# Patient Record
Sex: FEMALE | Race: WHITE
[De-identification: names, ages, dates, MRNs, and addresses within clinical notes are randomized per-mention and may not be internally consistent; named-entity substitution may affect disease eponyms.]

---

## 2019-02-25 VITALS — DIASTOLIC BLOOD PRESSURE: 74 MMHG | SYSTOLIC BLOOD PRESSURE: 138 MMHG

## 2019-02-26 ENCOUNTER — HOSPITAL ENCOUNTER (INPATIENT)
Dept: HOSPITAL 61 - 5 SOUTH | Age: 84
LOS: 3 days | Discharge: HOME | DRG: 690 | End: 2019-03-01
Attending: FAMILY MEDICINE | Admitting: FAMILY MEDICINE
Payer: MEDICARE

## 2019-02-26 VITALS — BODY MASS INDEX: 32.44 KG/M2 | HEIGHT: 64 IN | WEIGHT: 190.03 LBS

## 2019-02-26 VITALS — DIASTOLIC BLOOD PRESSURE: 99 MMHG | SYSTOLIC BLOOD PRESSURE: 182 MMHG

## 2019-02-26 VITALS — DIASTOLIC BLOOD PRESSURE: 74 MMHG | SYSTOLIC BLOOD PRESSURE: 138 MMHG

## 2019-02-26 DIAGNOSIS — E11.9: ICD-10-CM

## 2019-02-26 DIAGNOSIS — Z90.89: ICD-10-CM

## 2019-02-26 DIAGNOSIS — I10: ICD-10-CM

## 2019-02-26 DIAGNOSIS — Z86.73: ICD-10-CM

## 2019-02-26 DIAGNOSIS — Z85.828: ICD-10-CM

## 2019-02-26 DIAGNOSIS — Z83.3: ICD-10-CM

## 2019-02-26 DIAGNOSIS — K57.30: ICD-10-CM

## 2019-02-26 DIAGNOSIS — Z98.42: ICD-10-CM

## 2019-02-26 DIAGNOSIS — Z90.710: ICD-10-CM

## 2019-02-26 DIAGNOSIS — Z98.41: ICD-10-CM

## 2019-02-26 DIAGNOSIS — Z80.0: ICD-10-CM

## 2019-02-26 DIAGNOSIS — Z79.82: ICD-10-CM

## 2019-02-26 DIAGNOSIS — Z87.442: ICD-10-CM

## 2019-02-26 DIAGNOSIS — N12: Primary | ICD-10-CM

## 2019-02-26 LAB
ALBUMIN SERPL-MCNC: 3.3 G/DL (ref 3.4–5)
ALBUMIN/GLOB SERPL: 0.8 {RATIO} (ref 1–1.7)
ALP SERPL-CCNC: 90 U/L (ref 46–116)
ALT SERPL-CCNC: 14 U/L (ref 14–59)
AMYLASE SERPL-CCNC: 35 U/L (ref 25–115)
ANION GAP SERPL CALC-SCNC: 11 MMOL/L (ref 6–14)
AST SERPL-CCNC: 14 U/L (ref 15–37)
BASOPHILS # BLD AUTO: 0 X10^3/UL (ref 0–0.2)
BASOPHILS NFR BLD: 0 % (ref 0–3)
BILIRUB SERPL-MCNC: 0.5 MG/DL (ref 0.2–1)
BUN SERPL-MCNC: 26 MG/DL (ref 7–20)
BUN/CREAT SERPL: 26 (ref 6–20)
CALCIUM SERPL-MCNC: 8.8 MG/DL (ref 8.5–10.1)
CHLORIDE SERPL-SCNC: 98 MMOL/L (ref 98–107)
CO2 SERPL-SCNC: 27 MMOL/L (ref 21–32)
CREAT SERPL-MCNC: 1 MG/DL (ref 0.6–1)
EOSINOPHIL NFR BLD: 0 % (ref 0–3)
EOSINOPHIL NFR BLD: 0 X10^3/UL (ref 0–0.7)
ERYTHROCYTE [DISTWIDTH] IN BLOOD BY AUTOMATED COUNT: 15.1 % (ref 11.5–14.5)
GFR SERPLBLD BASED ON 1.73 SQ M-ARVRAT: 52.3 ML/MIN
GLOBULIN SER-MCNC: 4 G/DL (ref 2.2–3.8)
GLUCOSE SERPL-MCNC: 201 MG/DL (ref 70–99)
HCT VFR BLD CALC: 39.7 % (ref 36–47)
HGB BLD-MCNC: 13.2 G/DL (ref 12–15.5)
LIPASE: 71 U/L (ref 73–393)
LYMPHOCYTES # BLD: 1.4 X10^3/UL (ref 1–4.8)
LYMPHOCYTES NFR BLD AUTO: 14 % (ref 24–48)
MCH RBC QN AUTO: 27 PG (ref 25–35)
MCHC RBC AUTO-ENTMCNC: 33 G/DL (ref 31–37)
MCV RBC AUTO: 81 FL (ref 79–100)
MONO #: 0.6 X10^3/UL (ref 0–1.1)
MONOCYTES NFR BLD: 6 % (ref 0–9)
NEUT #: 8.4 X10^3UL (ref 1.8–7.7)
NEUTROPHILS NFR BLD AUTO: 80 % (ref 31–73)
PLATELET # BLD AUTO: 236 X10^3/UL (ref 140–400)
POTASSIUM SERPL-SCNC: 3.9 MMOL/L (ref 3.5–5.1)
PROT SERPL-MCNC: 7.3 G/DL (ref 6.4–8.2)
RBC # BLD AUTO: 4.91 X10^6/UL (ref 3.5–5.4)
SODIUM SERPL-SCNC: 136 MMOL/L (ref 136–145)
WBC # BLD AUTO: 10.4 X10^3/UL (ref 4–11)

## 2019-02-26 PROCEDURE — 81001 URINALYSIS AUTO W/SCOPE: CPT

## 2019-02-26 PROCEDURE — 87086 URINE CULTURE/COLONY COUNT: CPT

## 2019-02-26 PROCEDURE — 85025 COMPLETE CBC W/AUTO DIFF WBC: CPT

## 2019-02-26 PROCEDURE — C9113 INJ PANTOPRAZOLE SODIUM, VIA: HCPCS

## 2019-02-26 PROCEDURE — 83690 ASSAY OF LIPASE: CPT

## 2019-02-26 PROCEDURE — 83036 HEMOGLOBIN GLYCOSYLATED A1C: CPT

## 2019-02-26 PROCEDURE — 82150 ASSAY OF AMYLASE: CPT

## 2019-02-26 PROCEDURE — 74177 CT ABD & PELVIS W/CONTRAST: CPT

## 2019-02-26 PROCEDURE — 36415 COLL VENOUS BLD VENIPUNCTURE: CPT

## 2019-02-26 PROCEDURE — 80053 COMPREHEN METABOLIC PANEL: CPT

## 2019-02-26 RX ADMIN — SODIUM CHLORIDE SCH MLS/HR: 450 INJECTION, SOLUTION INTRAVENOUS at 22:20

## 2019-02-26 RX ADMIN — ONDANSETRON PRN MG: 2 INJECTION INTRAMUSCULAR; INTRAVENOUS at 22:20

## 2019-02-26 NOTE — NUR
The patient, OPHELIA MAHMOOD, 87 y/o, F admitted by GABI BROCK MD, was given written 
information regarding hospital policies, unit procedures and contact persons.  Patient 
arrived to room via wheelchair assisted by registration staff member. Patient's daughter is 
at bedside at this time. 



Valuables were checked and noted. The patient was resting in bed at this time. Patient is 
Alert and oriented and is able to make their needs known at this time. Patient states no 
complaints of pain or discomfort while being asked admission questions. However the patient 
did become nauseous and started to vomit shortly after finishing admission questions. This 
RN is attempting to reach MD at this time for admit orders. The patient's call light is 
within reach. This RN will continue to monitor the patient at this time.

## 2019-02-27 VITALS — DIASTOLIC BLOOD PRESSURE: 76 MMHG | SYSTOLIC BLOOD PRESSURE: 131 MMHG

## 2019-02-27 VITALS — DIASTOLIC BLOOD PRESSURE: 71 MMHG | SYSTOLIC BLOOD PRESSURE: 126 MMHG

## 2019-02-27 VITALS — SYSTOLIC BLOOD PRESSURE: 131 MMHG | DIASTOLIC BLOOD PRESSURE: 63 MMHG

## 2019-02-27 VITALS — SYSTOLIC BLOOD PRESSURE: 117 MMHG | DIASTOLIC BLOOD PRESSURE: 67 MMHG

## 2019-02-27 VITALS — SYSTOLIC BLOOD PRESSURE: 124 MMHG | DIASTOLIC BLOOD PRESSURE: 74 MMHG

## 2019-02-27 VITALS — DIASTOLIC BLOOD PRESSURE: 44 MMHG | SYSTOLIC BLOOD PRESSURE: 135 MMHG

## 2019-02-27 LAB
APTT PPP: YELLOW S
BACTERIA #/AREA URNS HPF: 0 /HPF
BILIRUB UR QL STRIP: NEGATIVE
FIBRINOGEN PPP-MCNC: CLEAR MG/DL
NITRITE UR QL STRIP: NEGATIVE
PH UR STRIP: 6.5 [PH]
PROT UR STRIP-MCNC: NEGATIVE MG/DL
RBC #/AREA URNS HPF: 0 /HPF (ref 0–2)
UROBILINOGEN UR-MCNC: 0.2 MG/DL
WBC #/AREA URNS HPF: (no result) /HPF (ref 0–4)

## 2019-02-27 RX ADMIN — SODIUM CHLORIDE SCH MLS/HR: 450 INJECTION, SOLUTION INTRAVENOUS at 08:59

## 2019-02-27 RX ADMIN — ONDANSETRON PRN MG: 2 INJECTION INTRAMUSCULAR; INTRAVENOUS at 08:56

## 2019-02-27 RX ADMIN — CLOPIDOGREL BISULFATE SCH MG: 75 TABLET ORAL at 08:55

## 2019-02-27 RX ADMIN — SODIUM CHLORIDE SCH MLS/HR: 450 INJECTION, SOLUTION INTRAVENOUS at 19:59

## 2019-02-27 RX ADMIN — ATORVASTATIN CALCIUM SCH MG: 40 TABLET, FILM COATED ORAL at 20:00

## 2019-02-27 RX ADMIN — CEFTRIAXONE SCH GM: 1 INJECTION, POWDER, FOR SOLUTION INTRAMUSCULAR; INTRAVENOUS at 10:09

## 2019-02-27 RX ADMIN — ASPIRIN 81 MG SCH MG: 81 TABLET ORAL at 08:56

## 2019-02-27 NOTE — NUR
SW following, discussed with RN. Pt is from home with . RN advised no SW needs at 
this time. SW will continue to follow.

## 2019-02-27 NOTE — HP
ADMIT DATE:  02/26/2019



CHIEF COMPLAINT AND HISTORY OF PRESENT ILLNESS:  This 88-year-old white female

is well known to me from followup in the office.  I did see the patient earlier

in the day in the office and she was felt to have a left pyelonephritis that was

partially treated.  She had been taking some amoxicillin at home that she had

left over from prior infection, but was just overall feeling horrible.  She

denied, however, any fevers, chills or sweats.  She was taking p.o. without

nausea or vomiting at that time; however, still appeared ill, was started on

Cipro with the urine culture obtained, was told if she was getting worse to call

the  who called on the evening of admission, stating she was much worse

now with nausea, vomiting, inability to keep things down and the patient was

directly admitted to the floor for IV hydration, nausea control, IV antibiotics

while we are awaiting cultures.



PAST MEDICAL HISTORY:  Remarkable for hypertension.  She had a prior

tonsillectomy, adenoidectomy, prior hysterectomy, history of skin cancers, has

had urinary tract infections over the years.  She does have a history of a

stroke.



MEDICATIONS:  Brought with the patient, listed on the computer, have been

addressed.



ALLERGIES:  She has no known drug allergies.



SOCIAL HISTORY:  The patient is a nonsmoker, nondrinker.  , lives at home

with her .



FAMILY HISTORY:  Noncontributory.



REVIEW OF SYSTEMS:  Remarkable for just the generalized feeling horrible all

over, the nausea, and vomiting which has gotten better with Zofran, ongoing left

flank pain.  She denies any dysuria, etc.



PHYSICAL EXAMINATION:

GENERAL:  She is well-developed, well-nourished white female, in no acute

distress.  Daughter is in attendance.

VITAL SIGNS:  Stable.  She is afebrile.

HEAD, EYES, EARS, NOSE AND THROAT:  Remarkable for several scabs on her left

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

keratosis.

NECK:  Supple, without adenopathy or thyromegaly.

CHEST:  Clear to auscultation and percussion.

HEART:  Regular rate and rhythm without S3, S4, or murmur.

ABDOMEN:  Soft, nontender, without hepatosplenomegaly or masses.  She does have

some left flank tenderness.

EXTREMITIES:  Without cyanosis, clubbing, edema.

NEUROLOGIC:  She is intact.



IMPRESSION:  Left pyelonephritis with failed outpatient treatment with nausea,

vomiting as discussed above.



PLAN:  Continue present IV hydration.  I am going to keep her on IV Rocephin

until we have a urine culture back and if one does not look like it was obtained

here yesterday as ordered, but we have one cooking in the office.

 



______________________________

GABI BROCK MD



DR:  REGINA/humphrey  JOB#:  [id number] / [id number]

DD:  02/27/2019 08:01  DT:  02/27/2019 08:38

## 2019-02-27 NOTE — RAD
CT study of the abdomen and pelvis with contrast

 

Clinical indications: Abdominal pain. Left flank pain. History of 

hysterectomy. History of skin cancer.

 

TECHNIQUE: After IV infusion of 60 cc of Omnipaque 300, helical CT 

scanning of the abdomen and pelvis was performed. No GI contrast was 

administered. This may decrease the sensitivity to detect GI tract 

pathology.

 

PQRS compliance Statement

 

One or more of the following individualized dose reduction techniques were

utilized for this study:

1.  Automated exposure control

2.  Adjustment of the mA and/or kV according to patient size

3.  Use of iterative reconstruction technique

 

FINDINGS: The liver and spleen are unremarkable. Diffuse fatty atrophy of 

the pancreas is seen. The gallbladder is normal and no extrahepatic 

biliary ductal dilatation is seen. No adrenal mass is evident. Small 

punctate nonobstructing stone of the left kidney is seen. No 

hydronephrosis or hydroureter is seen. No renal mass is evident. Urinary 

bladder wall is smooth. No focal aneurysmal dilatation of the abdominal 

aorta is seen. No enlarged abdominal or pelvic lymphadenopathy is evident.

No obstructive bowel pattern is evident. The appendix is normal. Terminal 

ileum is unremarkable. Mild sigmoid diverticulosis is seen without 

diverticulitis. A small hiatal hernia is seen. Stomach is not distended. 

No free air or free fluid or mesenteric edema is seen. No lytic process is

seen. No lung base consolidation is evident. Calcific granuloma of the 

posterior right lung base is seen.

 

IMPRESSION: No acute abnormality is evident.

 

Electronically signed by: Philippe Brito MD (2/27/2019 9:59 AM) [facility]

## 2019-02-28 VITALS — SYSTOLIC BLOOD PRESSURE: 152 MMHG | DIASTOLIC BLOOD PRESSURE: 97 MMHG

## 2019-02-28 VITALS — DIASTOLIC BLOOD PRESSURE: 86 MMHG | SYSTOLIC BLOOD PRESSURE: 150 MMHG

## 2019-02-28 VITALS — SYSTOLIC BLOOD PRESSURE: 156 MMHG | DIASTOLIC BLOOD PRESSURE: 92 MMHG

## 2019-02-28 VITALS — SYSTOLIC BLOOD PRESSURE: 134 MMHG | DIASTOLIC BLOOD PRESSURE: 68 MMHG

## 2019-02-28 VITALS — SYSTOLIC BLOOD PRESSURE: 169 MMHG | DIASTOLIC BLOOD PRESSURE: 88 MMHG

## 2019-02-28 VITALS — SYSTOLIC BLOOD PRESSURE: 153 MMHG | DIASTOLIC BLOOD PRESSURE: 92 MMHG

## 2019-02-28 LAB — HBA1C MFR BLD: 6.4 % (ref 4.8–5.6)

## 2019-02-28 RX ADMIN — ASPIRIN 81 MG SCH MG: 81 TABLET ORAL at 09:00

## 2019-02-28 RX ADMIN — SODIUM CHLORIDE SCH MLS/HR: 450 INJECTION, SOLUTION INTRAVENOUS at 06:05

## 2019-02-28 RX ADMIN — ONDANSETRON PRN MG: 2 INJECTION INTRAMUSCULAR; INTRAVENOUS at 03:35

## 2019-02-28 RX ADMIN — FENTANYL CITRATE PRN MCG: 50 INJECTION INTRAMUSCULAR; INTRAVENOUS at 09:02

## 2019-02-28 RX ADMIN — CEFTRIAXONE SCH GM: 1 INJECTION, POWDER, FOR SOLUTION INTRAMUSCULAR; INTRAVENOUS at 09:02

## 2019-02-28 RX ADMIN — FENTANYL CITRATE PRN MCG: 50 INJECTION INTRAMUSCULAR; INTRAVENOUS at 06:27

## 2019-02-28 RX ADMIN — Medication SCH CAP: at 20:24

## 2019-02-28 RX ADMIN — FENTANYL CITRATE PRN MCG: 50 INJECTION INTRAMUSCULAR; INTRAVENOUS at 03:29

## 2019-02-28 RX ADMIN — PANTOPRAZOLE SODIUM SCH MG: 40 INJECTION, POWDER, FOR SOLUTION INTRAVENOUS at 17:42

## 2019-02-28 RX ADMIN — ATORVASTATIN CALCIUM SCH MG: 40 TABLET, FILM COATED ORAL at 20:24

## 2019-02-28 RX ADMIN — CLOPIDOGREL BISULFATE SCH MG: 75 TABLET ORAL at 09:00

## 2019-02-28 RX ADMIN — ONDANSETRON PRN MG: 2 INJECTION INTRAMUSCULAR; INTRAVENOUS at 09:06

## 2019-02-28 RX ADMIN — ONDANSETRON PRN MG: 2 INJECTION INTRAMUSCULAR; INTRAVENOUS at 16:24

## 2019-02-28 NOTE — PN
DATE:  02/28/2019



She is in room 552.



SUBJECTIVE:  The patient is awake, alert, miserable with pain, nausea, no

vomiting during my exam.  Family is present.



OBJECTIVE:

VITAL SIGNS:  Stable and she is afebrile.  She is awake and alert.

LUNGS:  Clear.

HEART:  Regular.

ABDOMEN:  Benign.  She does have some left flank tenderness still to pressure,

but none to percussion this morning.



LABORATORY DATA:  CT abdomen and pelvis with contrast showed no acute

abnormality.  Urine here does not look like infection; however, she had

antibiotics prior to admission, not ruling out the possibility of a

pyelonephritis, it was partially treated.



ASSESSMENT:

1.  Left flank pain with nausea and vomiting, felt to be pyelo at least on

admission as an outpatient.  Urine culture cooking, which will be available

today or tomorrow.

2.  Nausea and vomiting.



PLAN:  Per family request.  We will add GI consult for the nausea and vomiting. 

We will ask Urology to see as it seems clinically that this is kidney generated,

but the urine and CT at this point would suggest not.

 



______________________________

GABI BROCK MD



DR:  REGINA/humphrey  JOB#:  [id number] / [id number]

DD:  02/28/2019 09:17  DT:  02/28/2019 20:08

## 2019-02-28 NOTE — PDOC2
GI CONSULT


Reason For Consult:


Vomiting





HPI:


HPI:


87 y/o female - history from chart, pt, and son.  History a bit difficult to 

nail down, but she hasn't been feeling well for 3 weeks.  Complains of left 

lower back ache - no radiation.  At some point felt "sick to her stomach" - 

timing is unclear and "I never felt nauseated!"  However, vomiting began after 

taking Cipro earlier this week.  Now says "the medicine makes me sick" - I 

believe referring to Zofran that she received after drinking broth this morning 

prior to "yellow" emesis.  





Denies reflux/heartburn, dysphagia, chronic n/v, hematemesis, abd pain, diarrhea

, constipation, hematochezia, melena, or weight loss.  Has to belch a lot.  Son 

says prior to this, her health has been "bullet proof."  She flips twin 

mattresses at home every Monday.  Her other son's friend is a hospitalist at 

[facility] and recommended an IVP.





Was seen in our office in 12/2017 for dysphagia and occasional heartburn.  

Interestingly, also reported n/w after taking Percocet for a back injury.  

Additionally, she was taking Nexium and meclizine at that time.  Had EGD w/ Dr. Coyle on 1/3/18 (which she did not recall this morning) which showed esophageal 

ring (dilated to 56Fr), hiatal hernia, and gastritis.  Distal esophagus and 

antral biopsies were negative (no H. pylori, EoE, or Horne's).  Thinks she 

had a colonoscopy 40 years ago that was normal.  Denies GB or pancreas history 

but had "the yellow jaundice" at age 11.  I believe she takes ASA and Plavix (

she says just to look at her list).  When asked about NSAIDs, she says "it 

thins my blood."  On Rocephin here, followed by urology.  Labs and CT 

unrevealing.





PMH:


PMH:


CVA, HTN, skin cancer, UTI, hiatal hernia, esophageal ring, diverticulosis, 

nephrolithiasis (noted on CT in 12/2017)


tonsillectomy, hysterectomy, left CEA, cataract removal





FH:


Family History:  Cancer (colon - sibling, aunt; breast - sibling, aunt, MGM), DM

, Other (Crohn's - sibling)





Social History:


Smoke:  No


ALCOHOL:  none


Drugs:  None





ROS:





GEN: Denies fevers, chills, sweats


HEENT: Denies blurred vision, sore throat


CV: Denies chest pain


RESP: Denies shortness of air, cough


GI: Per HPI


: Denies hematuria, dysuria


ENDO: Denies weight changes


NEURO: Denies confusion, dizziness


MSK: +back pain


SKIN: Denies jaundice, pruritus





Vitals:


Vitals:





 Vital Signs








  Date Time  Temp Pulse Resp B/P (MAP) Pulse Ox O2 Delivery O2 Flow Rate FiO2


 


2/28/19 11:01 98.6 78 20 150/86 (107) 96 Room Air  





 98.6       











Labs:


Labs:





Laboratory Tests








Test


 2/27/19


11:41


 


Urine Collection Type Unknown 


 


Urine Color Yellow 


 


Urine Clarity Clear 


 


Urine pH 6.5 


 


Urine Specific Gravity >=1.030 


 


Urine Protein


 Negative mg/dL


(NEG-TRACE)


 


Urine Glucose (UA)


 Negative mg/dL


(NEG)


 


Urine Ketones (Stick)


 Negative mg/dL


(NEG)


 


Urine Blood Negative (NEG) 


 


Urine Nitrite Negative (NEG) 


 


Urine Bilirubin Negative (NEG) 


 


Urine Urobilinogen Dipstick


 0.2 mg/dL (0.2


mg/dL)


 


Urine Leukocyte Esterase Negative (NEG) 


 


Urine RBC 0 /HPF (0-2) 


 


Urine WBC 1-4 /HPF (0-4) 


 


Urine Bacteria 0 /HPF (0-FEW) 











Allergies:


Coded Allergies:  


     No Known Drug Allergies (Unverified , 2/26/19)





Medications:





Current Medications








 Medications


  (Trade)  Dose


 Ordered  Sig/Nava


 Route


 PRN Reason  Start Time


 Stop Time Status Last Admin


Dose Admin


 


 Atorvastatin


 Calcium


  (Lipitor)  40 mg  HS


 PO


   2/27/19 21:00


    2/27/19 20:00





 


 Ondansetron HCl


  (Zofran)  8 mg  PRN Q6HRS  PRN


 IV


 NAUSEA/VOMITING  2/27/19 15:30


    2/28/19 09:06














Imaging:


Imaging:


CT A/P


FINDINGS: The liver and spleen are unremarkable. Diffuse fatty atrophy of the 

pancreas is seen. The gallbladder is normal and no extrahepatic biliary ductal 

dilatation is seen. No adrenal mass is evident. Small punctate nonobstructing 

stone of the left kidney is seen. No hydronephrosis or hydroureter is seen. No 

renal mass is evident. Urinary bladder wall is smooth. No focal aneurysmal 

dilatation of the abdominal aorta is seen. No enlarged abdominal or pelvic 

lymphadenopathy is evident. No obstructive bowel pattern is evident. The 

appendix is normal. Terminal ileum is unremarkable. Mild sigmoid diverticulosis 

is seen without diverticulitis. A small hiatal hernia is seen. Stomach is not 

distended. No free air or free fluid or mesenteric edema is seen. No lytic 

process is seen. No lung base consolidation is evident. Calcific granuloma of 

the posterior right lung base is seen.


IMPRESSION: No acute abnormality is evident.





PE:





GEN: hunched over in bed holding emesis basin - left lower back/flank pain down 

not worsen with palpation during my exam


HEENT: Atraumatic, PERRL


LUNGS: CTAB


HEART: RRR


ABD: NABS, S/ND/NT


EXTREMITY: No edema


SKIN: No rashes, no jaundice


NEURO/PSYCH: A & O 3, anxious





A/P:


A/P:


Left lower back/flank pain - ?MSK, ?related to flipping mattress


Vomiting - ?related to pain or med


H/o belching


H/o hiatal hernia and esophageal ring s/p dilation in 1/2018


CRC screen - 40 years ago


Diverticulosis


"Jaundice" as a child


H/o CVA on Plavix and ASA, h/o nephrolithiasis





--


Will give empiric acid reducer - IV for now since vomiting this morning after 

broth.


Other per Dr. Huang.











JAYA VALENTIN Feb 28, 2019 11:37

## 2019-02-28 NOTE — NUR
SW following for discharge planning. Discussed with RN. RN advised no SW needs. Pt is 
currently on IV Rocephin Q24. Pt should discharge home when ready. SW will continue to 
follow.

## 2019-02-28 NOTE — PDOC2
JUANIS RILEY APRN 2/28/19 1115:


UROLOGY CONSULT


Date of Consult


Date of Consult


DATE: 2/28/19 


TIME: 10:36





Identification/Chief Complaint


Chief Complaint


Left flank pain





Source


Source:  Caregiver, Chart review, Patient





History of Present Illness


Reason for Visit:


This 88 year odl female was sent into the hospital by her PCP Dr. Palomino for a 

diagnosis of pyelonephritis.  Evidently she had been complaining of left flank 

pain and he gave her a prescription for cipro, which she threw up after taking.

  She took a second one around 7 pm and threw this up also, and at that time 

she came into the emergency department where a CT scan was done.  She is 

currently getting Rocephin q 24 hours. Patient complains of pain to left side 

and left flank intermittently for about three weeks now.  She had a lot of pain 

when Dr. Palomino did CVA testing this morning, but she is not in any pain 

currently, only having nausea. She wants an IVP because an MD friend of hers 

told her she needs it and will go to another hospital if it isn't ordered for 

her here. She denies dysuria, incontinence, hematuria or OAB symptoms. Son 

related that she has a "stash" of amoxicillin at home that she takes when she 

starts to feel sick with a URI and she apparently did this two weeks ago when 

she started to have some respiratory symptoms but otherwise she has not been on 

antibiotics.  She does not really get UTI's and has not had one in the past two 

years that she can remember. Patient started throwing up before FNP could ask 

her about kidney stone history.





Past Medical History


Cardiovascular:  HTN


Dermatology:  Other (skin cancers)


Grav:  4


Para:  4





Social History


No


ALCOHOL:  none


Lives:  with Family





Current Medications


Current Medications





Current Medications


Atorvastatin Calcium (Lipitor) 40 mg HS PO  Last administered on 2/27/19at 20:00

;  Start 2/27/19 at 21:00


Ondansetron HCl (Zofran) 8 mg PRN Q6HRS  PRN IV NAUSEA/VOMITING Last 

administered on 2/28/19at 09:06;  Start 2/27/19 at 15:30





Allergies


Allergies:  


Coded Allergies:  


     No Known Drug Allergies (Unverified , 2/26/19)





ROS


Review Of Systems:


CONSTITUTIONAL:        No fever or chills


EYES:                          No recent changes


SKIN:               No rash or itching


CARDIOVASCULAR:     No chest pain, syncope, palpitations, or edema


RESPIRATORY:            No SOB or cough


GASTROINTESTINAL:    + vomiting, left flank pain 


NEUROLOGICAL:          No headaches or weakness


ENDOCRINE:               No cold or heat intolerance


GENITOURINARY:         No urgency or frequency of urination


MUSCULOSKELETAL:   + left flank pain 


LYMPHATICS:               No enlarged lymph nodes


PSYCHIATRIC:              No anxiety or depression





Physical Exam


Physical Exam:


General: Pleasant, no acute distress, well groomed


Eyes: conjunctiva anicteric, eyes full range of motion


ENT: moist oral mucosa, normal dentition


Neck: Trachea midline, no masses


Respiratory: unlabored breathing, not using accessory muscles, 


Back: No CVA pain.  


Abdomen: nontender, nondistended, no hepatosplenomegaly, no masses


Skin: no rashes or skin lesions on visualized skin


Psych: normal mood, affect. Alert and oriented x 3.





Vitals


VITALS





Vital Signs








  Date Time  Temp Pulse Resp B/P (MAP) Pulse Ox O2 Delivery O2 Flow Rate FiO2


 


2/28/19 09:02   14     


 


2/28/19 08:00      Room Air  


 


2/28/19 07:00 98.0 91  153/92 (112) 96   





 98.0       











Labs


Labs





Laboratory Tests








Test


 2/26/19


22:40 2/27/19


11:41


 


White Blood Count


 10.4 x10^3/uL


(4.0-11.0) 





 


Red Blood Count


 4.91 x10^6/uL


(3.50-5.40) 





 


Hemoglobin


 13.2 g/dL


(12.0-15.5) 





 


Hematocrit


 39.7 %


(36.0-47.0) 





 


Mean Corpuscular Volume 81 fL ()  


 


Mean Corpuscular Hemoglobin 27 pg (25-35)  


 


Mean Corpuscular Hemoglobin


Concent 33 g/dL


(31-37) 





 


Red Cell Distribution Width


 15.1 %


(11.5-14.5) 





 


Platelet Count


 236 x10^3/uL


(140-400) 





 


Neutrophils (%) (Auto) 80 % (31-73)  


 


Lymphocytes (%) (Auto) 14 % (24-48)  


 


Monocytes (%) (Auto) 6 % (0-9)  


 


Eosinophils (%) (Auto) 0 % (0-3)  


 


Basophils (%) (Auto) 0 % (0-3)  


 


Neutrophils # (Auto)


 8.4 x10^3uL


(1.8-7.7) 





 


Lymphocytes # (Auto)


 1.4 x10^3/uL


(1.0-4.8) 





 


Monocytes # (Auto)


 0.6 x10^3/uL


(0.0-1.1) 





 


Eosinophils # (Auto)


 0.0 x10^3/uL


(0.0-0.7) 





 


Basophils # (Auto)


 0.0 x10^3/uL


(0.0-0.2) 





 


Sodium Level


 136 mmol/L


(136-145) 





 


Potassium Level


 3.9 mmol/L


(3.5-5.1) 





 


Chloride Level


 98 mmol/L


() 





 


Carbon Dioxide Level


 27 mmol/L


(21-32) 





 


Anion Gap 11 (6-14)  


 


Blood Urea Nitrogen


 26 mg/dL


(7-20) 





 


Creatinine


 1.0 mg/dL


(0.6-1.0) 





 


Estimated GFR


(Cockcroft-Gault) 52.3 


 





 


BUN/Creatinine Ratio 26 (6-20)  


 


Glucose Level


 201 mg/dL


(70-99) 





 


Calcium Level


 8.8 mg/dL


(8.5-10.1) 





 


Total Bilirubin


 0.5 mg/dL


(0.2-1.0) 





 


Aspartate Amino Transf


(AST/SGOT) 14 U/L (15-37) 


 





 


Alanine Aminotransferase


(ALT/SGPT) 14 U/L (14-59) 


 





 


Alkaline Phosphatase


 90 U/L


() 





 


Total Protein


 7.3 g/dL


(6.4-8.2) 





 


Albumin


 3.3 g/dL


(3.4-5.0) 





 


Albumin/Globulin Ratio 0.8 (1.0-1.7)  


 


Amylase Level


 35 U/L


() 





 


Lipase


 71 U/L


() 





 


Urine Collection Type  Unknown 


 


Urine Color  Yellow 


 


Urine Clarity  Clear 


 


Urine pH  6.5 


 


Urine Specific Gravity  >=1.030 


 


Urine Protein


 


 Negative mg/dL


(NEG-TRACE)


 


Urine Glucose (UA)


 


 Negative mg/dL


(NEG)


 


Urine Ketones (Stick)


 


 Negative mg/dL


(NEG)


 


Urine Blood  Negative (NEG) 


 


Urine Nitrite  Negative (NEG) 


 


Urine Bilirubin  Negative (NEG) 


 


Urine Urobilinogen Dipstick


 


 0.2 mg/dL (0.2


mg/dL)


 


Urine Leukocyte Esterase  Negative (NEG) 


 


Urine RBC  0 /HPF (0-2) 


 


Urine WBC  1-4 /HPF (0-4) 


 


Urine Bacteria  0 /HPF (0-FEW) 








Laboratory Tests








Test


 2/27/19


11:41


 


Urine Collection Type Unknown 


 


Urine Color Yellow 


 


Urine Clarity Clear 


 


Urine pH 6.5 


 


Urine Specific Gravity >=1.030 


 


Urine Protein


 Negative mg/dL


(NEG-TRACE)


 


Urine Glucose (UA)


 Negative mg/dL


(NEG)


 


Urine Ketones (Stick)


 Negative mg/dL


(NEG)


 


Urine Blood Negative (NEG) 


 


Urine Nitrite Negative (NEG) 


 


Urine Bilirubin Negative (NEG) 


 


Urine Urobilinogen Dipstick


 0.2 mg/dL (0.2


mg/dL)


 


Urine Leukocyte Esterase Negative (NEG) 


 


Urine RBC 0 /HPF (0-2) 


 


Urine WBC 1-4 /HPF (0-4) 


 


Urine Bacteria 0 /HPF (0-FEW) 











Images


Images


CT ABD/Pelvis:


IMPRESSION: No acute abnormality is evident.





Assessment/Plan


Assessment/Plan


CT abd pelvis shows no acute abnormalities.


UA is clean with 0 bacteria, leukocytes, nitrites, or blood


PVR is 0 times three via bladder scanner. 


WBC WNL at 10.4


CRT 1.0, BUN 26


Pt afebrile


I have discussed the case with Dr. Charles and relayed patient's request for an 

IVP to him.  In light negative findings I will leave the decision whether to 

order or not up to my supervising MD.   He will be by later to discuss with 

patient.





BESS CHARLES MD 2/28/19 1621:


UROLOGY CONSULT


Assessment/Plan


Assessment/Plan


Agree with assessment and plan. CT, labs unremarkable from urologic 

perspective. Do not recommend IVP, CT already performed is sufficient.


Do not suspect urologic cause for her nausea and and pain. No additional 

recommendations at this time.











JUANIS RILEY Feb 28, 2019 11:15


BESS CHARLES MD Feb 28, 2019 16:21

## 2019-03-01 VITALS — SYSTOLIC BLOOD PRESSURE: 150 MMHG | DIASTOLIC BLOOD PRESSURE: 80 MMHG

## 2019-03-01 VITALS — SYSTOLIC BLOOD PRESSURE: 143 MMHG | DIASTOLIC BLOOD PRESSURE: 72 MMHG

## 2019-03-01 VITALS — DIASTOLIC BLOOD PRESSURE: 82 MMHG | SYSTOLIC BLOOD PRESSURE: 150 MMHG

## 2019-03-01 RX ADMIN — PANTOPRAZOLE SODIUM SCH MG: 40 INJECTION, POWDER, FOR SOLUTION INTRAVENOUS at 07:29

## 2019-03-01 RX ADMIN — Medication SCH CAP: at 09:16

## 2019-03-01 RX ADMIN — SODIUM CHLORIDE SCH MLS/HR: 450 INJECTION, SOLUTION INTRAVENOUS at 01:26

## 2019-03-01 RX ADMIN — CLOPIDOGREL BISULFATE SCH MG: 75 TABLET ORAL at 09:16

## 2019-03-01 RX ADMIN — ASPIRIN 81 MG SCH MG: 81 TABLET ORAL at 09:16

## 2019-03-01 RX ADMIN — CEFTRIAXONE SCH GM: 1 INJECTION, POWDER, FOR SOLUTION INTRAMUSCULAR; INTRAVENOUS at 09:17

## 2019-03-01 NOTE — NUR
Discharge Note:



JOSE MAHMOOD [facility]



Discharge instructions and discharge home medications reviewed with Patient and a copy 
given. All questions have been answered and understanding verbalized. 



The following instructions and handouts were given: follow up, medications, when to seek 
medical attention.



Discontinued lines and drains: peripheral IV dc'd intact.



Patient discharged to home with family.